# Patient Record
Sex: FEMALE
[De-identification: names, ages, dates, MRNs, and addresses within clinical notes are randomized per-mention and may not be internally consistent; named-entity substitution may affect disease eponyms.]

---

## 2018-12-18 ENCOUNTER — HOSPITAL ENCOUNTER (OUTPATIENT)
Dept: HOSPITAL 92 - BICMAMMO | Age: 53
Discharge: HOME | End: 2018-12-18
Attending: FAMILY MEDICINE
Payer: COMMERCIAL

## 2018-12-18 DIAGNOSIS — Z12.31: Primary | ICD-10-CM

## 2018-12-18 DIAGNOSIS — N63.20: ICD-10-CM

## 2018-12-18 PROCEDURE — 77063 BREAST TOMOSYNTHESIS BI: CPT

## 2018-12-18 PROCEDURE — 77067 SCR MAMMO BI INCL CAD: CPT

## 2018-12-26 ENCOUNTER — HOSPITAL ENCOUNTER (OUTPATIENT)
Dept: HOSPITAL 92 - BICULT | Age: 53
Discharge: HOME | End: 2018-12-26
Attending: FAMILY MEDICINE
Payer: COMMERCIAL

## 2018-12-26 DIAGNOSIS — R92.8: Primary | ICD-10-CM

## 2020-02-29 NOTE — ULT
LEFT BREAST ULTRASOUND:

 

History: Abnormal mammogram. 

 

FINDINGS: 

Correlation is made with mammogram of 12-18-18. 

 

Sonographic evaluation of the upper outer region of the left breast demonstrates multiple cysts, the 
largest measuring 2.2 cm at the 12 o'clock position corresponding to the mammographic finding. 

 

IMPRESSION: 

BIRADS category 2 - benign findings. Return to annual mammographic screening. 

 

POS: OFF Pt is here for flu vaccine